# Patient Record
Sex: FEMALE | Race: WHITE | Employment: FULL TIME | URBAN - METROPOLITAN AREA
[De-identification: names, ages, dates, MRNs, and addresses within clinical notes are randomized per-mention and may not be internally consistent; named-entity substitution may affect disease eponyms.]

---

## 2017-01-30 PROBLEM — M51.24 HERNIATED THORACIC DISC WITHOUT MYELOPATHY: Chronic | Status: ACTIVE | Noted: 2017-01-30

## 2017-10-23 ENCOUNTER — HOSPITAL ENCOUNTER (OUTPATIENT)
Dept: GENERAL RADIOLOGY | Age: 47
Discharge: HOME | End: 2017-10-23
Attending: RADIOLOGY | Admitting: RADIOLOGY
Payer: COMMERCIAL

## 2017-10-23 ENCOUNTER — HOSPITAL ENCOUNTER (OUTPATIENT)
Dept: CT IMAGING | Age: 47
Discharge: HOME | End: 2017-10-23
Attending: NEUROLOGICAL SURGERY
Payer: COMMERCIAL

## 2017-10-23 VITALS
WEIGHT: 145.31 LBS | BODY MASS INDEX: 25.75 KG/M2 | SYSTOLIC BLOOD PRESSURE: 134 MMHG | HEIGHT: 63 IN | HEART RATE: 62 BPM | DIASTOLIC BLOOD PRESSURE: 78 MMHG | OXYGEN SATURATION: 99 % | TEMPERATURE: 97.7 F | RESPIRATION RATE: 16 BRPM

## 2017-10-23 DIAGNOSIS — M54.50 LOW BACK PAIN: ICD-10-CM

## 2017-10-23 DIAGNOSIS — M51.04 INTERVERTEBRAL THORACIC DISC DISORDER WITH MYELOPATHY, THORACIC REGION: ICD-10-CM

## 2017-10-23 DIAGNOSIS — M51.04 THORACIC DISC DISORDER WITH MYELOPATHY: ICD-10-CM

## 2017-10-23 PROCEDURE — 72129 CT CHEST SPINE W/DYE: CPT

## 2017-10-23 PROCEDURE — 62305 MYELOGRAPHY LUMBAR INJECTION: CPT

## 2017-10-23 PROCEDURE — 74011636320 HC RX REV CODE- 636/320: Performed by: RADIOLOGY

## 2017-10-23 PROCEDURE — 74011250637 HC RX REV CODE- 250/637: Performed by: RADIOLOGY

## 2017-10-23 PROCEDURE — 72132 CT LUMBAR SPINE W/DYE: CPT

## 2017-10-23 RX ORDER — OXYCODONE AND ACETAMINOPHEN 10; 325 MG/1; MG/1
1 TABLET ORAL
Status: DISCONTINUED | OUTPATIENT
Start: 2017-10-23 | End: 2017-10-23

## 2017-10-23 RX ORDER — DIAZEPAM 5 MG/1
10 TABLET ORAL
Status: COMPLETED | OUTPATIENT
Start: 2017-10-23 | End: 2017-10-23

## 2017-10-23 RX ORDER — LIDOCAINE HYDROCHLORIDE 10 MG/ML
1-5 INJECTION, SOLUTION EPIDURAL; INFILTRATION; INTRACAUDAL; PERINEURAL
Status: COMPLETED | OUTPATIENT
Start: 2017-10-23 | End: 2017-10-23

## 2017-10-23 RX ORDER — HYDROMORPHONE HYDROCHLORIDE 2 MG/1
4 TABLET ORAL
Status: DISCONTINUED | OUTPATIENT
Start: 2017-10-23 | End: 2017-10-23 | Stop reason: HOSPADM

## 2017-10-23 RX ORDER — GABAPENTIN 600 MG/1
1200 TABLET ORAL 3 TIMES DAILY
COMMUNITY

## 2017-10-23 RX ORDER — LIDOCAINE HYDROCHLORIDE 10 MG/ML
1-5 INJECTION INFILTRATION; PERINEURAL
Status: DISCONTINUED | OUTPATIENT
Start: 2017-10-23 | End: 2017-10-23

## 2017-10-23 RX ORDER — HYDROMORPHONE HYDROCHLORIDE 4 MG/1
4 TABLET ORAL
Status: DISCONTINUED | OUTPATIENT
Start: 2017-10-23 | End: 2017-10-23 | Stop reason: CLARIF

## 2017-10-23 RX ORDER — TIZANIDINE HYDROCHLORIDE 2 MG/1
2 CAPSULE, GELATIN COATED ORAL 3 TIMES DAILY
COMMUNITY

## 2017-10-23 RX ORDER — TRAMADOL HYDROCHLORIDE 50 MG/1
100 TABLET ORAL
COMMUNITY

## 2017-10-23 RX ADMIN — IOPAMIDOL 20 ML: 408 INJECTION, SOLUTION INTRATHECAL at 11:50

## 2017-10-23 RX ADMIN — HYDROMORPHONE HYDROCHLORIDE 4 MG: 2 TABLET ORAL at 13:20

## 2017-10-23 RX ADMIN — DIAZEPAM 10 MG: 5 TABLET ORAL at 11:31

## 2017-10-23 RX ADMIN — LIDOCAINE HYDROCHLORIDE 5 ML: 10 INJECTION, SOLUTION EPIDURAL; INFILTRATION; INTRACAUDAL; PERINEURAL at 11:48

## 2017-10-23 NOTE — IP AVS SNAPSHOT
[address] Patient: Lynda Georges MRN: [id number] :1970 You are allergic to the following Allergen Reactions Sulfa (Sulfonamide Antibiotics) Anaphylaxis Penicillins Unknown (comments) As a child Recent Documentation Height Weight BMI OB Status Smoking Status 1.6 m 65.9 kg 25.74 kg/m2 Hysterectomy Former Smoker Emergency Contacts Name Discharge Info Relation Home Work Mobile [employer] DISCHARGE CAREGIVER [3] Spouse [3]   [phone] DonteSol DISCHARGE CAREGIVER [3] Daughter [21] [phone] About your hospitalization You were admitted on:  2017 You last received care in the:  [address] [facility] [address] [place] were discharged on:  2017 Unit phone number:  [phone] Why you were hospitalized Your primary diagnosis was:  Not on File Providers Seen During Your Hospitalizations Provider Role Specialty Primary office phone Chemo Patel MD Attending Provider Radiology [phone] Your Primary Care Physician (PCP) Primary Care Physician Office Phone Office Fax NONE ** None ** ** None ** Follow-up Information Follow up With Details Comments Contact Info None   None ([phone]) Patient stated that they have no PCP Current Discharge Medication List  
  
CONTINUE these medications which have NOT CHANGED Dose & Instructions Dispensing Information Comments Morning Noon Evening Bedtime ALPRAZolam 1 mg tablet Commonly known as:  Mecca Mijares Your last dose was: Your next dose is:    
   
   
 Dose:  1 mg Take 1 mg by mouth two (2) times a day. Refills:  0 [facility] Your last dose was: Your next dose is:    
   
   
 Dose:  2 Puff Take 2 Puffs by inhalation two (2) times a day. Refills:  0 loratadine 10 mg Cap Your last dose was: Your next dose is: Take  by mouth. Refills:  0  
     
   
   
   
  
 losartan-hydroCHLOROthiazide 50-12.5 mg per tablet Commonly known as:  HYZAAR Your last dose was: Your next dose is:    
   
   
 Dose:  1 Tab Take 1 Tab by mouth daily. Refills:  0 MIRALAX 17 gram/dose powder Generic drug:  polyethylene glycol Your last dose was: Your next dose is:    
   
   
 Dose:  17 g Take 17 g by mouth as needed. Refills:  0  
     
   
   
   
  
 montelukast 10 mg tablet Commonly known as:  SINGULAIR Your last dose was: Your next dose is:    
   
   
 Dose:  10 mg Take 10 mg by mouth daily. Refills:  0 NEURONTIN 600 mg tablet Generic drug:  gabapentin Your last dose was: Your next dose is:    
   
   
 Dose:  1200 mg Take 1,200 mg by mouth three (3) times daily. Indications: NEUROPATHIC PAIN Refills:  0  
     
   
   
   
  
 omeprazole 40 mg capsule Commonly known as:  PRILOSEC Your last dose was: Your next dose is:    
   
   
 Dose:  40 mg Take 40 mg by mouth daily. Refills:  0 QVAR IN Your last dose was: Your next dose is:    
   
   
 Dose:  1 Puff Take 1 Puff by inhalation daily. Refills:  0  
     
   
   
   
  
 traMADol 50 mg tablet Commonly known as:  ULTRAM  
   
Your last dose was: Your next dose is:    
   
   
 Dose:  100 mg Take 100 mg by mouth every eight (8) hours as needed for Pain. Refills:  0  
     
   
   
   
  
 ZANAFLEX 2 mg capsule Generic drug:  tiZANidine Your last dose was: Your next dose is:    
   
   
 Dose:  2 mg Take 2 mg by mouth three (3) times daily. Refills:  0 Discharge Instructions Please call the [facility] Radiology department at [phone] for specific questions following your exam.  
Valium (Diazepam) Discharge instructions You received the narcotic listed above. This medication was given to you to help decrease your anxiety/discomfort and allow you to complete your examination Common Side Effects: 
 
Impairment of memory for up to 24 hours, sleepiness, slurred speech, dizziness, visual disturbance such as blurred vision or difficulty focusing, confusion, euphoria (pain relief), chills, ears feeling blocked or dreaming. It is recommended that you do not drive or operate equipment for at least 24 hours. You should not drink any alcoholic beverages for at least 24 hours. Do not take any other muscle relaxers, sedatives, hypnotics, or mood altering medications for 24 hours unless ordered by your physician who is aware that you received this medication. Call your physician for any questions or problems. He/She may contact this Radiology Department for further information. Leila Kathleen RN Myelogram: About This Test 
What is it? A myelogram uses X-rays and a special dye to make pictures of bones and nerves of the spine (spinal canal). The spinal canal holds the spinal cord, the spinal nerve roots, and the fluid-filled space between the bones in your spine. Why is this test done? A myelogram is done to check for: · The cause of arm or leg numbness, weakness, or pain. · Narrowing of the spinal canal (spinal stenosis). · A tumor or infection causing problems with the spinal cord or nerve roots. · A spinal disc that has ruptured (herniated disc). · Inflammation of the membrane that covers the brain and spinal cord. · Problems with the blood vessels to the spine.  
How can you prepare for the test? 
Your doctor will tell you if you need to change how much you eat and drink before the myelogram. You may be asked to increase the amount of water you drink before the test. Follow the instructions your doctor gives you about eating and drinking. Before a myelogram, tell your doctor if: 
· You are allergic to any medicines, contrast material, or iodine dye. · You have had bleeding problems, or you take a blood thinner, such as aspirin, clopidogrel (Plavix), or warfarin (Coumadin), or you take over-the-counter medicines, such as ibuprofen (Advil, Motrin) or naproxen (Aleve). Your doctor will tell you when you should stop taking these medicines several days before your procedure. Make sure that you understand exactly what he or she wants you to do. · You have had kidney problems. · You have diabetes, especially if you take metformin (Glucophage, for example). · You are or might be pregnant. Ask someone to take you home and stay with you after the test. 
What happens during the test? 
The dye is put into your spinal canal with a thin needle. This is called a lumbar puncture. The dye moves through the space so the nerve roots and spinal cord can be seen more clearly. After the dye is put in, you will lie still while the X-ray pictures are taken. How does it feel? You will feel a quick sting from a small needle that has medicine to numb the skin on your back. You will also feel some pressure as the long, thin spinal needle is put into your spinal canal. You may feel a quick sharp pain down your buttock or leg when the needle is moved in your spine. The dye may make you feel warm and flushed and have a metallic taste in your mouth. What else should you know about the test? 
In rare cases, the hole made by the needle in the sac around the spine does not close normally. This can allow spinal fluid to leak out. This leak may need to be repaired through a procedure called an epidural blood patch. To do the patch, your doctor injects some of your own blood to cover the hole. How long does the test take? · A myelogram usually takes 30 minutes to 1 hour. What happens after the test? 
· You will probably be able to go home 30 minutes to 2 hours after the test. 
· You may need to lie in bed with your head raised for 4 to 24 hours after the test. To prevent seizures, which are a rare side effect, do not bend over or lie down with your head lower than your body. Keeping your head higher than your body after a myelogram also may help prevent or reduce other side effects, such as headache, nausea, or vomiting. · Avoid strenuous activity, such as running or heavy lifting, for at least 1 day after your myelogram. 
· Drink plenty of water after the myelogram. Your doctor will give you instructions on taking your regular medicines. When should you call for help? Call 911 anytime you think you may need emergency care. For example, call if: 
· You have a seizure. Call your doctor now or seek immediate medical care if: 
· You have any increase in pain, weakness, or numbness in your legs. · You have a severe headache or stiff neck, or your eyes become very sensitive to light. · You have a headache that lasts longer than 24 hours. · You have problems urinating or having a bowel movement. · You have a fever. Follow-up care is a key part of your treatment and safety. Be sure to make and go to all appointments, and call your doctor if you are having problems. It's also a good idea to keep a list of the medicines you take. Ask your doctor when you can expect to have your test results. Where can you learn more? Go to http://debbie-richie.info/. Enter C656 in the search box to learn more about \"Myelogram: About This Test.\" Current as of: October 14, 2016 Content Version: 11.3 © 3962-9294 [employer].  Care instructions adapted under license by [employer] (which disclaims liability or warranty for this information). If you have questions about a medical condition or this instruction, always ask your healthcare professional. [address] any warranty or liability for your use of this information. DISCHARGE SUMMARY from Nurse The following personal items are in your possession at time of discharge: 
 
Dental Appliances: None Home Medications: None Jewelry: None Clothing: Pants, Shirt, Footwear Other Valuables: Cell Phone, Eri Hand PATIENT INSTRUCTIONS: 
 
After general anesthesia or intravenous sedation, for 24 hours or while taking prescription Narcotics: · Limit your activities · Do not drive and operate hazardous machinery · Do not make important personal or business decisions · Do  not drink alcoholic beverages · If you have not urinated within 8 hours after discharge, please contact your surgeon on call. Report the following to your surgeon: 
· Excessive pain, swelling, redness or odor of or around the surgical area · Temperature over 100.5 · Nausea and vomiting lasting longer than 4 hours or if unable to take medications · Any signs of decreased circulation or nerve impairment to extremity: change in color, persistent  numbness, tingling, coldness or increase pain · Any questions What to do at Home: 
Recommended activity: Activity as tolerated and no driving for today. These are general instructions for a healthy lifestyle: No smoking/ No tobacco products/ Avoid exposure to second hand smoke Surgeon General's Warning:  Quitting smoking now greatly reduces serious risk to your health. Obesity, smoking, and sedentary lifestyle greatly increases your risk for illness A healthy diet, regular physical exercise & weight monitoring are important for maintaining a healthy lifestyle You may be retaining fluid if you have a history of heart failure or if you experience any of the following symptoms:  Weight gain of 3 pounds or more overnight or 5 pounds in a week, increased swelling in our hands or feet or shortness of breath while lying flat in bed. Please call your doctor as soon as you notice any of these symptoms; do not wait until your next office visit. Recognize signs and symptoms of STROKE: 
 
F-face looks uneven A-arms unable to move or move unevenly S-speech slurred or non-existent T-time-call 911 as soon as signs and symptoms begin-DO NOT go Back to bed or wait to see if you get better-TIME IS BRAIN. Warning Signs of HEART ATTACK Call 911 if you have these symptoms: 
? Chest discomfort. Most heart attacks involve discomfort in the center of the chest that lasts more than a few minutes, or that goes away and comes back. It can feel like uncomfortable pressure, squeezing, fullness, or pain. ? Discomfort in other areas of the upper body. Symptoms can include pain or discomfort in one or both arms, the back, neck, jaw, or stomach. ? Shortness of breath with or without chest discomfort. ? Other signs may include breaking out in a cold sweat, nausea, or lightheadedness. Don't wait more than five minutes to call [address]! Fast action can save your life. Calling 911 is almost always the fastest way to get lifesaving treatment. Emergency Medical Services staff can begin treatment when they arrive  up to an hour sooner than if someone gets to the hospital by car. The discharge information has been reviewed with the patient. The patient verbalized understanding. Discharge medications reviewed with the patient and appropriate educational materials and side effects teaching were provided. Patient armband removed and given to patient to take home. Patient was informed of the privacy risks if armband lost or stolen Discharge Orders None Introducing [facility]!    
 Dear Terrie Benitez: 
 Thank you for requesting a Spotjournal account. Our records indicate that you already have an active Spotjournal account. You can access your account anytime at [email or web address] Did you know that you can access your hospital and ER discharge instructions at any time in Spotjournal? You can also review all of your test results from your hospital stay or ER visit. Additional Information If you have questions, please visit the Frequently Asked Questions section of the Spotjournal website at [email or web address] Remember, Spotjournal is NOT to be used for urgent needs. For medical emergencies, dial 911. Now available from your iPhone and Android! General Information Please provide this summary of care documentation to your next provider. Patient Signature:  ____________________________________________________________ Date:  ____________________________________________________________  
  
Alana Alina Provider Signature:  ____________________________________________________________ Date:  ____________________________________________________________

## 2017-10-23 NOTE — DISCHARGE INSTRUCTIONS
Please call the [facility] Radiology department at [phone] for specific questions following your exam.   Valium (Diazepam) Discharge instructions    You received the narcotic listed above. This medication was given to you to help decrease your anxiety/discomfort and allow you to complete your examination       Common Side Effects:    Impairment of memory for up to 24 hours, sleepiness, slurred speech, dizziness, visual disturbance such as blurred vision or difficulty focusing, confusion, euphoria (pain relief), chills, ears feeling blocked or dreaming. It is recommended that you do not drive or operate equipment for at least 24 hours. You should not drink any alcoholic beverages for at least 24 hours. Do not take any other muscle relaxers, sedatives, hypnotics, or mood altering medications for 24 hours unless ordered by your physician who is aware that you received this medication. Call your physician for any questions or problems. He/She may contact this Radiology Department for further information. Drake Ohara RN          Myelogram: About This Test  What is it? A myelogram uses X-rays and a special dye to make pictures of bones and nerves of the spine (spinal canal). The spinal canal holds the spinal cord, the spinal nerve roots, and the fluid-filled space between the bones in your spine. Why is this test done? A myelogram is done to check for:  · The cause of arm or leg numbness, weakness, or pain. · Narrowing of the spinal canal (spinal stenosis). · A tumor or infection causing problems with the spinal cord or nerve roots. · A spinal disc that has ruptured (herniated disc). · Inflammation of the membrane that covers the brain and spinal cord. · Problems with the blood vessels to the spine.   How can you prepare for the test?  Your doctor will tell you if you need to change how much you eat and drink before the myelogram. You may be asked to increase the amount of water you drink before the test. Follow the instructions your doctor gives you about eating and drinking. Before a myelogram, tell your doctor if:  · You are allergic to any medicines, contrast material, or iodine dye. · You have had bleeding problems, or you take a blood thinner, such as aspirin, clopidogrel (Plavix), or warfarin (Coumadin), or you take over-the-counter medicines, such as ibuprofen (Advil, Motrin) or naproxen (Aleve). Your doctor will tell you when you should stop taking these medicines several days before your procedure. Make sure that you understand exactly what he or she wants you to do. · You have had kidney problems. · You have diabetes, especially if you take metformin (Glucophage, for example). · You are or might be pregnant. Ask someone to take you home and stay with you after the test.  What happens during the test?  The dye is put into your spinal canal with a thin needle. This is called a lumbar puncture. The dye moves through the space so the nerve roots and spinal cord can be seen more clearly. After the dye is put in, you will lie still while the X-ray pictures are taken. How does it feel? You will feel a quick sting from a small needle that has medicine to numb the skin on your back. You will also feel some pressure as the long, thin spinal needle is put into your spinal canal. You may feel a quick sharp pain down your buttock or leg when the needle is moved in your spine. The dye may make you feel warm and flushed and have a metallic taste in your mouth. What else should you know about the test?  In rare cases, the hole made by the needle in the sac around the spine does not close normally. This can allow spinal fluid to leak out. This leak may need to be repaired through a procedure called an epidural blood patch. To do the patch, your doctor injects some of your own blood to cover the hole. How long does the test take?   · A myelogram usually takes 30 minutes to 1 hour.  What happens after the test?  · You will probably be able to go home 30 minutes to 2 hours after the test.  · You may need to lie in bed with your head raised for 4 to 24 hours after the test. To prevent seizures, which are a rare side effect, do not bend over or lie down with your head lower than your body. Keeping your head higher than your body after a myelogram also may help prevent or reduce other side effects, such as headache, nausea, or vomiting. · Avoid strenuous activity, such as running or heavy lifting, for at least 1 day after your myelogram.  · Drink plenty of water after the myelogram. Your doctor will give you instructions on taking your regular medicines. When should you call for help? Call 911 anytime you think you may need emergency care. For example, call if:  · You have a seizure. Call your doctor now or seek immediate medical care if:  · You have any increase in pain, weakness, or numbness in your legs. · You have a severe headache or stiff neck, or your eyes become very sensitive to light. · You have a headache that lasts longer than 24 hours. · You have problems urinating or having a bowel movement. · You have a fever. Follow-up care is a key part of your treatment and safety. Be sure to make and go to all appointments, and call your doctor if you are having problems. It's also a good idea to keep a list of the medicines you take. Ask your doctor when you can expect to have your test results. Where can you learn more? Go to http://debbie-richie.info/. Enter U205 in the search box to learn more about \"Myelogram: About This Test.\"  Current as of: October 14, 2016  Content Version: 11.3  © 6394-8430 [employer]. Care instructions adapted under license by [employer] (which disclaims liability or warranty for this information).  If you have questions about a medical condition or this instruction, always ask your healthcare professional. [employer] disclaims any warranty or liability for your use of this information. DISCHARGE SUMMARY from Nurse    The following personal items are in your possession at time of discharge:    Dental Appliances: None        Home Medications: None  Jewelry: None  Clothing: Pants, Shirt, Footwear  Other Valuables: Cell Phone, Purse, Wallet             PATIENT INSTRUCTIONS:    After general anesthesia or intravenous sedation, for 24 hours or while taking prescription Narcotics:  · Limit your activities  · Do not drive and operate hazardous machinery  · Do not make important personal or business decisions  · Do  not drink alcoholic beverages  · If you have not urinated within 8 hours after discharge, please contact your surgeon on call. Report the following to your surgeon:  · Excessive pain, swelling, redness or odor of or around the surgical area  · Temperature over 100.5  · Nausea and vomiting lasting longer than 4 hours or if unable to take medications  · Any signs of decreased circulation or nerve impairment to extremity: change in color, persistent  numbness, tingling, coldness or increase pain  · Any questions        What to do at Home:  Recommended activity: Activity as tolerated and no driving for today. These are general instructions for a healthy lifestyle:    No smoking/ No tobacco products/ Avoid exposure to second hand smoke    Surgeon General's Warning:  Quitting smoking now greatly reduces serious risk to your health. Obesity, smoking, and sedentary lifestyle greatly increases your risk for illness    A healthy diet, regular physical exercise & weight monitoring are important for maintaining a healthy lifestyle    You may be retaining fluid if you have a history of heart failure or if you experience any of the following symptoms:  Weight gain of 3 pounds or more overnight or 5 pounds in a week, increased swelling in our hands or feet or shortness of breath while lying flat in bed. Please call your doctor as soon as you notice any of these symptoms; do not wait until your next office visit. Recognize signs and symptoms of STROKE:    F-face looks uneven    A-arms unable to move or move unevenly    S-speech slurred or non-existent    T-time-call 911 as soon as signs and symptoms begin-DO NOT go       Back to bed or wait to see if you get better-TIME IS BRAIN. Warning Signs of HEART ATTACK     Call 911 if you have these symptoms:   Chest discomfort. Most heart attacks involve discomfort in the center of the chest that lasts more than a few minutes, or that goes away and comes back. It can feel like uncomfortable pressure, squeezing, fullness, or pain.  Discomfort in other areas of the upper body. Symptoms can include pain or discomfort in one or both arms, the back, neck, jaw, or stomach.  Shortness of breath with or without chest discomfort.  Other signs may include breaking out in a cold sweat, nausea, or lightheadedness. Don't wait more than five minutes to call 911 - MINUTES MATTER! Fast action can save your life. Calling 911 is almost always the fastest way to get lifesaving treatment. Emergency Medical Services staff can begin treatment when they arrive -- up to an hour sooner than if someone gets to the hospital by car. The discharge information has been reviewed with the patient. The patient verbalized understanding. Discharge medications reviewed with the patient and appropriate educational materials and side effects teaching were provided. Patient armband removed and given to patient to take home.   Patient was informed of the privacy risks if armband lost or stolen

## 2017-10-23 NOTE — IP AVS SNAPSHOT
Summary of Care Report The Summary of Care report has been created to help improve care coordination. Users with access to [employer] or [address] (Web-based application) may access additional patient information including the Discharge Summary. If you are not currently a [address] user and need more information, please call the number listed below in the [place] [address] section and ask to be connected with Medical Records. Facility Information Name Address Phone [facility] [address] [place] [id number] [phone] Patient Information Patient Name Sex JUAN Macedo ([id number]) Female 1970 Discharge Information Admitting Provider Service Area Unit Carito Barcenas MD / [address]  / [phone] Discharge Provider Discharge Date/Time Discharge Disposition Destination (none) 10/23/2017 15:45 (Pending) AHR (none) Patient Language Language ENGLISH [13] Hospital Problems as of 10/23/2017  Reviewed: 2017  8:08 AM by Kayleigh Noyola MD  
 None Non-Hospital Problems as of 10/23/2017  Reviewed: 2017  8:08 AM by Kayleigh Noyola MD  
  
  
  
 Class Noted - Resolved Last Modified Active Problems Herniated thoracic disc without myelopathy (Chronic)  2017 - Present 2017 by Kayleigh Noyola MD  
  Entered by Kayleigh Noyola MD  
  
You are allergic to the following Allergen Reactions Sulfa (Sulfonamide Antibiotics) Anaphylaxis Penicillins Unknown (comments) As a child Current Discharge Medication List  
  
CONTINUE these medications which have NOT CHANGED Dose & Instructions Dispensing Information Comments ALPRAZolam 1 mg tablet Commonly known as:  Varghese Shea Dose:  1 mg Take 1 mg by mouth two (2) times a day. Refills:  0 [address] Dose:  2 Puff Take 2 Puffs by inhalation two (2) times a day. Refills:  0  
   
 loratadine 10 mg Cap Take  by mouth. Refills:  0  
   
 losartan-hydroCHLOROthiazide 50-12.5 mg per tablet Commonly known as:  HYZAAR Dose:  1 Tab Take 1 Tab by mouth daily. Refills:  0 MIRALAX 17 gram/dose powder Generic drug:  polyethylene glycol Dose:  17 g Take 17 g by mouth as needed. Refills:  0  
   
 montelukast 10 mg tablet Commonly known as:  SINGULAIR Dose:  10 mg Take 10 mg by mouth daily. Refills:  0 NEURONTIN 600 mg tablet Generic drug:  gabapentin Dose:  1200 mg Take 1,200 mg by mouth three (3) times daily. Indications: NEUROPATHIC PAIN Refills:  0  
   
 omeprazole 40 mg capsule Commonly known as:  PRILOSEC Dose:  40 mg Take 40 mg by mouth daily. Refills:  0 QVAR IN Dose:  1 Puff Take 1 Puff by inhalation daily. Refills:  0  
   
 traMADol 50 mg tablet Commonly known as:  ULTRAM  
 Dose:  100 mg Take 100 mg by mouth every eight (8) hours as needed for Pain. Refills:  0  
   
 ZANAFLEX 2 mg capsule Generic drug:  tiZANidine Dose:  2 mg Take 2 mg by mouth three (3) times daily. Refills:  0 Surgery Information ID Date/Time Status Primary Surgeon All Procedures Location [id number] 10/23/2017 [facility] RAD ANGIO IR OR-DO NOT SCHEDULE Follow-up Information Follow up With Details Comments Contact Info None   None (395) Patient stated that they have no PCP Discharge Instructions Please call the [facility] Radiology department at [phone] for specific questions following your exam.  
Valium (Diazepam) Discharge instructions You received the narcotic listed above. This medication was given to you to help decrease your anxiety/discomfort and allow you to complete your examination Common Side Effects: Impairment of memory for up to 24 hours, sleepiness, slurred speech, dizziness, visual disturbance such as blurred vision or difficulty focusing, confusion, euphoria (pain relief), chills, ears feeling blocked or dreaming. It is recommended that you do not drive or operate equipment for at least 24 hours. You should not drink any alcoholic beverages for at least 24 hours. Do not take any other muscle relaxers, sedatives, hypnotics, or mood altering medications for 24 hours unless ordered by your physician who is aware that you received this medication. Call your physician for any questions or problems. He/She may contact this Radiology Department for further information. Jacoby Allen RN Myelogram: About This Test 
What is it? A myelogram uses X-rays and a special dye to make pictures of bones and nerves of the spine (spinal canal). The spinal canal holds the spinal cord, the spinal nerve roots, and the fluid-filled space between the bones in your spine. Why is this test done? A myelogram is done to check for: · The cause of arm or leg numbness, weakness, or pain. · Narrowing of the spinal canal (spinal stenosis). · A tumor or infection causing problems with the spinal cord or nerve roots. · A spinal disc that has ruptured (herniated disc). · Inflammation of the membrane that covers the brain and spinal cord. · Problems with the blood vessels to the spine. How can you prepare for the test? 
Your doctor will tell you if you need to change how much you eat and drink before the myelogram. You may be asked to increase the amount of water you drink before the test. Follow the instructions your doctor gives you about eating and drinking. Before a myelogram, tell your doctor if: 
· You are allergic to any medicines, contrast material, or iodine dye.  
· You have had bleeding problems, or you take a blood thinner, such as aspirin, clopidogrel (Plavix), or warfarin (Coumadin), or you take over-the-counter medicines, such as ibuprofen (Advil, Motrin) or naproxen (Aleve). Your doctor will tell you when you should stop taking these medicines several days before your procedure. Make sure that you understand exactly what he or she wants you to do. · You have had kidney problems. · You have diabetes, especially if you take metformin (Glucophage, for example). · You are or might be pregnant. Ask someone to take you home and stay with you after the test. 
What happens during the test? 
The dye is put into your spinal canal with a thin needle. This is called a lumbar puncture. The dye moves through the space so the nerve roots and spinal cord can be seen more clearly. After the dye is put in, you will lie still while the X-ray pictures are taken. How does it feel? You will feel a quick sting from a small needle that has medicine to numb the skin on your back. You will also feel some pressure as the long, thin spinal needle is put into your spinal canal. You may feel a quick sharp pain down your buttock or leg when the needle is moved in your spine. The dye may make you feel warm and flushed and have a metallic taste in your mouth. What else should you know about the test? 
In rare cases, the hole made by the needle in the sac around the spine does not close normally. This can allow spinal fluid to leak out. This leak may need to be repaired through a procedure called an epidural blood patch. To do the patch, your doctor injects some of your own blood to cover the hole. How long does the test take? · A myelogram usually takes 30 minutes to 1 hour.  
What happens after the test? 
· You will probably be able to go home 30 minutes to 2 hours after the test. 
· You may need to lie in bed with your head raised for 4 to 24 hours after the test. To prevent seizures, which are a rare side effect, do not bend over or lie down with your head lower than your body. Keeping your head higher than your body after a myelogram also may help prevent or reduce other side effects, such as headache, nausea, or vomiting. · Avoid strenuous activity, such as running or heavy lifting, for at least 1 day after your myelogram. 
· Drink plenty of water after the myelogram. Your doctor will give you instructions on taking your regular medicines. When should you call for help? Call 911 anytime you think you may need emergency care. For example, call if: 
· You have a seizure. Call your doctor now or seek immediate medical care if: 
· You have any increase in pain, weakness, or numbness in your legs. · You have a severe headache or stiff neck, or your eyes become very sensitive to light. · You have a headache that lasts longer than 24 hours. · You have problems urinating or having a bowel movement. · You have a fever. Follow-up care is a key part of your treatment and safety. Be sure to make and go to all appointments, and call your doctor if you are having problems. It's also a good idea to keep a list of the medicines you take. Ask your doctor when you can expect to have your test results. Where can you learn more? Go to http://debbie-richie.info/. Enter L559 in the search box to learn more about \"Myelogram: About This Test.\" Current as of: October 14, 2016 Content Version: 11.3 © 2220-9226 [employer]. Care instructions adapted under license by [employer] (which disclaims liability or warranty for this information). If you have questions about a medical condition or this instruction, always ask your healthcare professional. [place] [address] any warranty or liability for your use of this information. DISCHARGE SUMMARY from Nurse The following personal items are in your possession at time of discharge: 
 
Dental Appliances: None Home Medications: None Jewelry: None Clothing: Pants, Shirt, Footwear Other Valuables: Cell Phone, Olimpia Quivers PATIENT INSTRUCTIONS: 
 
After general anesthesia or intravenous sedation, for 24 hours or while taking prescription Narcotics: · Limit your activities · Do not drive and operate hazardous machinery · Do not make important personal or business decisions · Do  not drink alcoholic beverages · If you have not urinated within 8 hours after discharge, please contact your surgeon on call. Report the following to your surgeon: 
· Excessive pain, swelling, redness or odor of or around the surgical area · Temperature over 100.5 · Nausea and vomiting lasting longer than 4 hours or if unable to take medications · Any signs of decreased circulation or nerve impairment to extremity: change in color, persistent  numbness, tingling, coldness or increase pain · Any questions What to do at Home: 
Recommended activity: Activity as tolerated and no driving for today. These are general instructions for a healthy lifestyle: No smoking/ No tobacco products/ Avoid exposure to second hand smoke Surgeon General's Warning:  Quitting smoking now greatly reduces serious risk to your health. Obesity, smoking, and sedentary lifestyle greatly increases your risk for illness A healthy diet, regular physical exercise & weight monitoring are important for maintaining a healthy lifestyle You may be retaining fluid if you have a history of heart failure or if you experience any of the following symptoms:  Weight gain of 3 pounds or more overnight or 5 pounds in a week, increased swelling in our hands or feet or shortness of breath while lying flat in bed. Please call your doctor as soon as you notice any of these symptoms; do not wait until your next office visit. Recognize signs and symptoms of STROKE: 
 
F-face looks uneven A-arms unable to move or move unevenly S-speech slurred or non-existent T-time-call 911 as soon as signs and symptoms begin-DO NOT go Back to bed or wait to see if you get better-TIME IS BRAIN. Warning Signs of HEART ATTACK Call 911 if you have these symptoms: 
? Chest discomfort. Most heart attacks involve discomfort in the center of the chest that lasts more than a few minutes, or that goes away and comes back. It can feel like uncomfortable pressure, squeezing, fullness, or pain. ? Discomfort in other areas of the upper body. Symptoms can include pain or discomfort in one or both arms, the back, neck, jaw, or stomach. ? Shortness of breath with or without chest discomfort. ? Other signs may include breaking out in a cold sweat, nausea, or lightheadedness. Don't wait more than five minutes to call [address]! Fast action can save your life. Calling 911 is almost always the fastest way to get lifesaving treatment. Emergency Medical Services staff can begin treatment when they arrive  up to an hour sooner than if someone gets to the hospital by car. The discharge information has been reviewed with the patient. The patient verbalized understanding. Discharge medications reviewed with the patient and appropriate educational materials and side effects teaching were provided. Patient armband removed and given to patient to take home. Patient was informed of the privacy risks if armband lost or stolen Chart Review Routing History No Routing History on File

## 2017-10-23 NOTE — PROCEDURES
Neuroradiology Brief Procedure Note    Interventional Radiologist: Blanquita Ramirez MD    Pre-operative Diagnosis: Pain. Post-operative Diagnosis: Same as pre-operative diagnosis    Procedure(s) Performed:  Thoracic and  Lumbar Myelogram    Anesthesia:  Local    Findings:  Informed consent. Thoracic and lumbar myelogram performed. Please see final dictated report for full details.     Complications: No immediate    Estimated Blood Loss:  None    Specimens: None    Blanquita Ramirez MD, MD  [facility]  Neuroradiology  10/23/2017

## 2022-03-18 PROBLEM — M51.24 HERNIATED THORACIC DISC WITHOUT MYELOPATHY: Status: ACTIVE | Noted: 2017-01-30

## 2022-05-16 ENCOUNTER — TRANSCRIBE ORDER (OUTPATIENT)
Dept: SCHEDULING | Age: 52
End: 2022-05-16

## 2022-05-16 DIAGNOSIS — Z12.31 ENCOUNTER FOR SCREENING MAMMOGRAM FOR BREAST CANCER: Primary | ICD-10-CM

## 2023-01-31 DIAGNOSIS — Z12.31 ENCOUNTER FOR SCREENING MAMMOGRAM FOR BREAST CANCER: Primary | ICD-10-CM

## 2023-02-03 DIAGNOSIS — Z12.31 ENCOUNTER FOR SCREENING MAMMOGRAM FOR BREAST CANCER: Primary | ICD-10-CM

## 2023-09-06 RX ORDER — PROMETHAZINE HYDROCHLORIDE 25 MG/1
25 TABLET ORAL EVERY 6 HOURS PRN
COMMUNITY
Start: 2018-10-11

## 2023-09-06 NOTE — PERIOP NOTE
Instructions for your procedure at [facility] Date: 9/6/2023      Patient's Name: Tatiana Dale      Procedure Date: 9/25/2023        Please enter the main entrance of the hospital and check-in at the  located in the lobby. Do NOT eat or drink anything, including candy, gum, or ice chips after midnight prior to your procedure, unless it is part of your prep. Brush your teeth before coming to the hospital.You may swish with water, but do not swallow. No smoking/Vaping/E-Cigarettes 24 hours prior to the day of procedure. No alcohol 24 hours prior to the day of procedure. No recreational drugs for one week prior to the day of procedure. Bring Photo ID, Insurance information, and Co-pay if required on day of procedure. Bring in pertinent legal documents, such as, Medical Power of MELISSA AGUILAR, DNR, Advance Directive, etc.  Leave all other valuables, including money/purse, at home. Remove jewelry, including ALL body piercings, nail polish, acrylic nails, and makeup (including mascara); no lotions, powders, deodorant, and/or perfume/cologne/after shave on the skin. Glasses and dentures may be worn to the hospital.  They must be removed prior to procedure. Please bring case/container for glasses or dentures. 11. Contacts should not be worn on day of procedure. 12. Call the office ([phone]) if you have symptoms of a cold or illness within 24-48 hours prior to your procedure. 13. AN ADULT (relative or friend 18 years or older) MUST DRIVE YOU HOME AFTER YOUR PROCEDURE. 14. Please make arrangements for a responsible adult (18 years or older) to be with you for 24 hours after your procedure. 13. ONE VISITOR will be allowed in the waiting area during your procedure. Special Instructions:      Bring list of CURRENT medications.   Follow instructions from the office regarding Bowel Prep, Vitamins, Iron, Blood Thinners, Insulin, Seizure, and Blood

## 2023-09-23 ENCOUNTER — ANESTHESIA EVENT (OUTPATIENT)
Facility: HOSPITAL | Age: 53
End: 2023-09-23
Payer: MEDICARE

## 2023-09-25 ENCOUNTER — ANESTHESIA (OUTPATIENT)
Facility: HOSPITAL | Age: 53
End: 2023-09-25
Payer: MEDICARE

## 2023-09-25 ENCOUNTER — HOSPITAL ENCOUNTER (OUTPATIENT)
Facility: HOSPITAL | Age: 53
Setting detail: OUTPATIENT SURGERY
Discharge: HOME | End: 2023-09-25
Attending: INTERNAL MEDICINE | Admitting: INTERNAL MEDICINE
Payer: MEDICARE

## 2023-09-25 VITALS
HEART RATE: 61 BPM | WEIGHT: 112.56 LBS | OXYGEN SATURATION: 98 % | HEIGHT: 63 IN | DIASTOLIC BLOOD PRESSURE: 76 MMHG | RESPIRATION RATE: 20 BRPM | SYSTOLIC BLOOD PRESSURE: 130 MMHG | TEMPERATURE: 98.4 F | BODY MASS INDEX: 19.95 KG/M2

## 2023-09-25 PROCEDURE — 3600007502: Performed by: INTERNAL MEDICINE

## 2023-09-25 PROCEDURE — 2500000003 HC RX 250 WO HCPCS: Performed by: NURSE ANESTHETIST, CERTIFIED REGISTERED

## 2023-09-25 PROCEDURE — 2580000003 HC RX 258: Performed by: NURSE ANESTHETIST, CERTIFIED REGISTERED

## 2023-09-25 PROCEDURE — 2709999900 HC NON-CHARGEABLE SUPPLY: Performed by: INTERNAL MEDICINE

## 2023-09-25 PROCEDURE — 7100000000 HC PACU RECOVERY - FIRST 15 MIN: Performed by: INTERNAL MEDICINE

## 2023-09-25 PROCEDURE — 3600007512: Performed by: INTERNAL MEDICINE

## 2023-09-25 PROCEDURE — 3700000000 HC ANESTHESIA ATTENDED CARE: Performed by: INTERNAL MEDICINE

## 2023-09-25 PROCEDURE — 7100000011 HC PHASE II RECOVERY - ADDTL 15 MIN: Performed by: INTERNAL MEDICINE

## 2023-09-25 PROCEDURE — 7100000010 HC PHASE II RECOVERY - FIRST 15 MIN: Performed by: INTERNAL MEDICINE

## 2023-09-25 PROCEDURE — 3700000001 HC ADD 15 MINUTES (ANESTHESIA): Performed by: INTERNAL MEDICINE

## 2023-09-25 PROCEDURE — 6360000002 HC RX W HCPCS: Performed by: NURSE ANESTHETIST, CERTIFIED REGISTERED

## 2023-09-25 RX ORDER — FAMOTIDINE 20 MG/1
20 TABLET, FILM COATED ORAL ONCE
Status: DISCONTINUED | OUTPATIENT
Start: 2023-09-25 | End: 2023-09-25 | Stop reason: HOSPADM

## 2023-09-25 RX ORDER — LIDOCAINE HYDROCHLORIDE 10 MG/ML
1 INJECTION, SOLUTION EPIDURAL; INFILTRATION; INTRACAUDAL; PERINEURAL
Status: DISCONTINUED | OUTPATIENT
Start: 2023-09-25 | End: 2023-09-25 | Stop reason: HOSPADM

## 2023-09-25 RX ORDER — SODIUM CHLORIDE, SODIUM LACTATE, POTASSIUM CHLORIDE, CALCIUM CHLORIDE 600; 310; 30; 20 MG/100ML; MG/100ML; MG/100ML; MG/100ML
INJECTION, SOLUTION INTRAVENOUS CONTINUOUS
Status: DISCONTINUED | OUTPATIENT
Start: 2023-09-25 | End: 2023-09-25 | Stop reason: HOSPADM

## 2023-09-25 RX ORDER — LIDOCAINE HYDROCHLORIDE 20 MG/ML
INJECTION, SOLUTION EPIDURAL; INFILTRATION; INTRACAUDAL; PERINEURAL PRN
Status: DISCONTINUED | OUTPATIENT
Start: 2023-09-25 | End: 2023-09-25 | Stop reason: SDUPTHER

## 2023-09-25 RX ORDER — PROPOFOL 10 MG/ML
INJECTION, EMULSION INTRAVENOUS PRN
Status: DISCONTINUED | OUTPATIENT
Start: 2023-09-25 | End: 2023-09-25 | Stop reason: SDUPTHER

## 2023-09-25 RX ADMIN — LIDOCAINE HYDROCHLORIDE 40 MG: 20 INJECTION, SOLUTION EPIDURAL; INFILTRATION; INTRACAUDAL; PERINEURAL at 11:07

## 2023-09-25 RX ADMIN — PROPOFOL 150 MG: 10 INJECTION, EMULSION INTRAVENOUS at 11:07

## 2023-09-25 RX ADMIN — SODIUM CHLORIDE, POTASSIUM CHLORIDE, SODIUM LACTATE AND CALCIUM CHLORIDE: 600; 310; 30; 20 INJECTION, SOLUTION INTRAVENOUS at 10:48

## 2023-09-25 ASSESSMENT — PAIN - FUNCTIONAL ASSESSMENT: PAIN_FUNCTIONAL_ASSESSMENT: NONE - DENIES PAIN

## 2023-09-25 ASSESSMENT — PAIN SCALES - GENERAL
PAINLEVEL_OUTOF10: 0
PAINLEVEL_OUTOF10: 0

## 2023-09-25 NOTE — ANESTHESIA POSTPROCEDURE EVALUATION
Department of Anesthesiology  Postprocedure Note    Patient: Priya Boone  MRN: [id number]  YOB: 1970  Date of evaluation: 9/25/2023      Procedure Summary     Date: 09/25/23 Room / Location: [facility] ENDO 03 / [facility] END[facility]COPY    Anesthesia Start: 1104 Anesthesia Stop: 1129    Procedure: COLONOSCOPY (Abdomen) Diagnosis:       Lower GI bleed      Constipation, unspecified constipation type      BMI 22.0-22.9, adult      (Lower GI bleed [K92.2])      (Constipation, unspecified constipation type [K59.00])      (BMI 22.0-22.9, adult [Y53.02])    Surgeons: Keren Skiff, MD Responsible Provider: Bina Tony MD    Anesthesia Type: MAC ASA Status: 2          Anesthesia Type: No value filed.     Aparna Phase I: Aparna Score: 10    Aparna Phase II: Aparna Score: 10      Anesthesia Post Evaluation    Patient location during evaluation: PACU  Patient participation: complete - patient participated  Level of consciousness: sleepy but conscious  Pain score: 0  Airway patency: patent  Nausea & Vomiting: no nausea and no vomiting  Complications: no  Cardiovascular status: blood pressure returned to baseline  Respiratory status: acceptable  Hydration status: euvolemic  Pain management: adequate

## 2023-12-07 ENCOUNTER — OFFICE VISIT (OUTPATIENT)
Age: 53
End: 2023-12-07
Payer: MEDICARE

## 2023-12-07 VITALS — HEIGHT: 63 IN | BODY MASS INDEX: 20.91 KG/M2 | WEIGHT: 118 LBS

## 2023-12-07 DIAGNOSIS — M79.672 LEFT FOOT PAIN: Primary | ICD-10-CM

## 2023-12-07 DIAGNOSIS — M25.572 LEFT ANKLE PAIN, UNSPECIFIED CHRONICITY: ICD-10-CM

## 2023-12-07 DIAGNOSIS — S92.355A CLOSED NONDISPLACED FRACTURE OF FIFTH METATARSAL BONE OF LEFT FOOT, INITIAL ENCOUNTER: ICD-10-CM

## 2023-12-07 PROCEDURE — 1036F TOBACCO NON-USER: CPT | Performed by: ORTHOPAEDIC SURGERY

## 2023-12-07 PROCEDURE — 99203 OFFICE O/P NEW LOW 30 MIN: CPT | Performed by: ORTHOPAEDIC SURGERY

## 2023-12-07 PROCEDURE — G8420 CALC BMI NORM PARAMETERS: HCPCS | Performed by: ORTHOPAEDIC SURGERY

## 2023-12-07 PROCEDURE — G8484 FLU IMMUNIZE NO ADMIN: HCPCS | Performed by: ORTHOPAEDIC SURGERY

## 2023-12-07 PROCEDURE — 28470 CLTX METATARSAL FX WO MNP EA: CPT | Performed by: ORTHOPAEDIC SURGERY

## 2023-12-07 PROCEDURE — 3017F COLORECTAL CA SCREEN DOC REV: CPT | Performed by: ORTHOPAEDIC SURGERY

## 2023-12-07 PROCEDURE — G8427 DOCREV CUR MEDS BY ELIG CLIN: HCPCS | Performed by: ORTHOPAEDIC SURGERY

## 2023-12-07 NOTE — PATIENT INSTRUCTIONS
Foot Pain: Care Instructions  Your Care Instructions     Foot injuries that cause pain and swelling are fairly common. Almost all sports or home repair projects can cause a misstep that ends up as foot pain. Normal wear and tear, especially as you get older, also can cause foot pain. Most minor foot injuries will heal on their own, and home treatment is usually all you need to do. If you have a severe injury, you may need tests and treatment. Follow-up care is a key part of your treatment and safety. Be sure to make and go to all appointments, and call your doctor if you are having problems. It's also a good idea to know your test results and keep a list of the medicines you take. How can you care for yourself at home? Take pain medicines exactly as directed. If the doctor gave you a prescription medicine for pain, take it as prescribed. If you are not taking a prescription pain medicine, ask your doctor if you can take an over-the-counter medicine. Rest and protect your foot. Take a break from any activity that may cause pain. Put ice or a cold pack on your foot for 10 to 20 minutes at a time. Put a thin cloth between the ice and your skin. Prop up the sore foot on a pillow when you ice it or anytime you sit or lie down during the next 3 days. Try to keep it above the level of your heart. This will help reduce swelling. Your doctor may recommend that you wrap your foot with an elastic bandage. Keep your foot wrapped for as long as your doctor advises. If your doctor recommends crutches, use them as directed. Wear roomy footwear. As soon as pain and swelling end, begin gentle exercises of your foot. Your doctor can tell you which exercises will help. When should you call for help? Call 911 anytime you think you may need emergency care. For example, call if:    Your foot turns pale, white, blue, or cold.    Call your doctor now or seek immediate medical care if:    You cannot move or stand on your

## 2024-01-02 ENCOUNTER — OFFICE VISIT (OUTPATIENT)
Age: 54
End: 2024-01-02

## 2024-01-02 DIAGNOSIS — M79.672 LEFT FOOT PAIN: Primary | ICD-10-CM

## 2024-01-02 DIAGNOSIS — S92.355D CLOSED NONDISPLACED FRACTURE OF FIFTH METATARSAL BONE OF LEFT FOOT WITH ROUTINE HEALING, SUBSEQUENT ENCOUNTER: ICD-10-CM

## 2024-01-02 PROCEDURE — 99024 POSTOP FOLLOW-UP VISIT: CPT

## 2024-01-02 RX ORDER — VENLAFAXINE HYDROCHLORIDE 37.5 MG/1
CAPSULE, EXTENDED RELEASE ORAL
COMMUNITY
Start: 2024-01-01

## 2024-01-02 RX ORDER — CEFUROXIME AXETIL 500 MG/1
500 TABLET ORAL EVERY 12 HOURS
COMMUNITY
Start: 2023-12-28

## 2024-01-02 RX ORDER — OMEPRAZOLE 20 MG/1
20 CAPSULE, DELAYED RELEASE ORAL 2 TIMES DAILY
COMMUNITY
Start: 2023-12-13

## 2024-01-29 NOTE — PROGRESS NOTES
Name: Sheron Fuller    : 1970     2:33 PM 2023    11:50 AM 2023    11:40 AM 2023    11:28 AM 2023    10:33 AM 2023     1:01 PM 7/10/2023     2:29 PM   Ambulatory Bariatric Summary   Systolic  130 115 110 134     Diastolic  76 72 72 71     Pulse  61 72 48 72     Temp    98.4 °F (36.9 °C) 98.4 °F (36.9 °C)     Respirations  20 18 15 16     Weight - Scale 118    112.56 117 120   Height 1.6 m (5' 3\")     1.6 m (5' 3\") 1.6 m (5' 3\")   BMI 20.9 kg/m2    20 kg/m2 20.8 kg/m2 21.3 kg/m2   Weight - Scale 53.5 kg (118 lb)    51.1 kg (112 lb 9 oz) 53.1 kg (117 lb) 54.4 kg (120 lb)   BMI (Calculated) 20.9    20 20.8 21.3       There is no height or weight on file to calculate BMI.    Service Dept: [facility]  [address] [place] [address]  Dept: [phone]  Dept Fax: [phone]     Patient's Pharmacies:    [facility]/pharmacy #[id number] - [place], [place] - [address] [place] [address] -  [phone] -  [phone]  [address] [place] [address]  Phone: [phone] Fax: [phone]       Chief Complaint   Patient presents with    Fracture    Ankle Pain       HPI:  The patient is here with a chief complaint of left foot pain, throbbing, burning pain. Previously diagnosed with a non-displaced 5th metatarsal fracture. Pain is a 2/10. Pain is controlled with current analgesics.  Medication(s) being used: acetaminophen, ibuprofen (OTC).     X-rays of the left foot AP, lateral and oblique done in our office today show a well healed non-displaced 5th metatarsal fracture.     There were no vitals taken for this visit.   Allergies   Allergen Reactions    Escitalopram Anaphylaxis    Sulfa Antibiotics Anaphylaxis    Penicillins      Other reaction(s): Unknown (comments)  As a child      Current Outpatient Medications   Medication Sig Dispense Refill    cefUROXime (CEFTIN) 500 MG tablet Take 1

## 2024-01-30 ENCOUNTER — OFFICE VISIT (OUTPATIENT)
Age: 54
End: 2024-01-30

## 2024-01-30 DIAGNOSIS — S92.355D CLOSED NONDISPLACED FRACTURE OF FIFTH METATARSAL BONE OF LEFT FOOT WITH ROUTINE HEALING, SUBSEQUENT ENCOUNTER: ICD-10-CM

## 2024-01-30 DIAGNOSIS — M79.672 LEFT FOOT PAIN: Primary | ICD-10-CM

## 2024-01-30 PROCEDURE — 99024 POSTOP FOLLOW-UP VISIT: CPT

## 2024-06-27 DIAGNOSIS — M25.512 LEFT SHOULDER PAIN, UNSPECIFIED CHRONICITY: Primary | ICD-10-CM

## 2024-06-28 ENCOUNTER — OFFICE VISIT (OUTPATIENT)
Age: 54
End: 2024-06-28
Payer: MEDICARE

## 2024-06-28 DIAGNOSIS — M25.512 LEFT SHOULDER PAIN, UNSPECIFIED CHRONICITY: Primary | ICD-10-CM

## 2024-06-28 PROCEDURE — 99213 OFFICE O/P EST LOW 20 MIN: CPT | Performed by: ORTHOPAEDIC SURGERY

## 2024-06-28 PROCEDURE — 1036F TOBACCO NON-USER: CPT | Performed by: ORTHOPAEDIC SURGERY

## 2024-06-28 PROCEDURE — G8427 DOCREV CUR MEDS BY ELIG CLIN: HCPCS | Performed by: ORTHOPAEDIC SURGERY

## 2024-06-28 PROCEDURE — G8420 CALC BMI NORM PARAMETERS: HCPCS | Performed by: ORTHOPAEDIC SURGERY

## 2024-06-28 PROCEDURE — 3017F COLORECTAL CA SCREEN DOC REV: CPT | Performed by: ORTHOPAEDIC SURGERY

## 2024-06-28 NOTE — PROGRESS NOTES
continued conservative pain management options the patient was advised to utilize Tylenol or OTC NSAIDS as long as it is not medically contraindicated.     Return to Office:    Follow-up and Dispositions    Return for POST CT.        Scribed by Yenni Glez LPN as dictated by Sawyer Blake MD.  Documentation, performed by, True and Accepted Sawyer Blake MD

## 2024-07-02 DIAGNOSIS — M25.512 LEFT SHOULDER PAIN, UNSPECIFIED CHRONICITY: ICD-10-CM

## 2024-07-31 ENCOUNTER — HOSPITAL ENCOUNTER (OUTPATIENT)
Age: 54
Discharge: HOME | End: 2024-08-03
Attending: ORTHOPAEDIC SURGERY
Payer: MEDICARE

## 2024-07-31 DIAGNOSIS — M25.512 LEFT SHOULDER PAIN, UNSPECIFIED CHRONICITY: ICD-10-CM

## 2024-07-31 PROCEDURE — 73201 CT UPPER EXTREMITY W/DYE: CPT

## 2024-07-31 PROCEDURE — 6360000004 HC RX CONTRAST MEDICATION: Performed by: ORTHOPAEDIC SURGERY

## 2024-07-31 PROCEDURE — 2580000003 HC RX 258: Performed by: ORTHOPAEDIC SURGERY

## 2024-07-31 PROCEDURE — 77002 NEEDLE LOCALIZATION BY XRAY: CPT

## 2024-07-31 RX ORDER — IOPAMIDOL 612 MG/ML
15 INJECTION, SOLUTION INTRATHECAL
Status: COMPLETED | OUTPATIENT
Start: 2024-07-31 | End: 2024-07-31

## 2024-07-31 RX ORDER — SODIUM CHLORIDE 0.9 % (FLUSH) 0.9 %
5-40 SYRINGE (ML) INJECTION 2 TIMES DAILY
Status: DISCONTINUED | OUTPATIENT
Start: 2024-07-31 | End: 2024-08-04 | Stop reason: HOSPADM

## 2024-07-31 RX ADMIN — Medication 10 ML: at 13:03

## 2024-07-31 RX ADMIN — IOPAMIDOL 30 ML: 612 INJECTION, SOLUTION INTRATHECAL at 13:02

## 2024-08-02 ENCOUNTER — OFFICE VISIT (OUTPATIENT)
Age: 54
End: 2024-08-02
Payer: MEDICARE

## 2024-08-02 VITALS — HEIGHT: 63 IN | BODY MASS INDEX: 20.91 KG/M2 | WEIGHT: 118 LBS

## 2024-08-02 DIAGNOSIS — M75.52 BURSITIS OF LEFT SHOULDER: Primary | ICD-10-CM

## 2024-08-02 DIAGNOSIS — M25.512 LEFT SHOULDER PAIN, UNSPECIFIED CHRONICITY: ICD-10-CM

## 2024-08-02 PROCEDURE — G8427 DOCREV CUR MEDS BY ELIG CLIN: HCPCS | Performed by: ORTHOPAEDIC SURGERY

## 2024-08-02 PROCEDURE — 20611 DRAIN/INJ JOINT/BURSA W/US: CPT | Performed by: ORTHOPAEDIC SURGERY

## 2024-08-02 PROCEDURE — 3017F COLORECTAL CA SCREEN DOC REV: CPT | Performed by: ORTHOPAEDIC SURGERY

## 2024-08-02 PROCEDURE — G8420 CALC BMI NORM PARAMETERS: HCPCS | Performed by: ORTHOPAEDIC SURGERY

## 2024-08-02 PROCEDURE — 99213 OFFICE O/P EST LOW 20 MIN: CPT | Performed by: ORTHOPAEDIC SURGERY

## 2024-08-02 PROCEDURE — 1036F TOBACCO NON-USER: CPT | Performed by: ORTHOPAEDIC SURGERY

## 2024-08-02 RX ORDER — LIDOCAINE HYDROCHLORIDE 10 MG/ML
9 INJECTION, SOLUTION INFILTRATION; PERINEURAL ONCE
Status: COMPLETED | OUTPATIENT
Start: 2024-08-02 | End: 2024-08-02

## 2024-08-02 RX ORDER — TRIAMCINOLONE ACETONIDE 40 MG/ML
40 INJECTION, SUSPENSION INTRA-ARTICULAR; INTRAMUSCULAR ONCE
Status: COMPLETED | OUTPATIENT
Start: 2024-08-02 | End: 2024-08-02

## 2024-08-02 RX ORDER — LIDOCAINE 50 MG/G
PATCH TOPICAL
COMMUNITY
Start: 2024-07-25

## 2024-08-02 RX ADMIN — LIDOCAINE HYDROCHLORIDE 9 ML: 10 INJECTION, SOLUTION INFILTRATION; PERINEURAL at 09:50

## 2024-08-02 RX ADMIN — TRIAMCINOLONE ACETONIDE 40 MG: 40 INJECTION, SUSPENSION INTRA-ARTICULAR; INTRAMUSCULAR at 09:51

## 2024-08-02 NOTE — PROGRESS NOTES
The needle was withdrawn and the puncture site sealed with a Band-Aid.      Technique: Under sterile conditions a [employer] ultrasound unit with a variable frequency (7.0-14.0 MHz) linear transducer was used to localize the placement of needle into the left joint.    Findings: Successful needle placement for shoulder injection.  Final images were taken and saved for permanent record.      The patient tolerated the injection well. The patient was instructed to call the office immediately if there is any pain, redness, warmth, fever, or chills.    Encounter Diagnoses   Name Primary?    Bursitis of left shoulder Yes    Left shoulder pain, unspecified chronicity          HPI:  The patient is here with a chief complaint of left shoulder pain, throbbing, burning pain, diagnosed with little bit of bursitis.  Post MRI no rotator cuff tear.  Post CT scan arthrogram, no rotator cuff tear.     Assessment/Plan:  1.  Left shoulder with bursitis.    Plan will be for physical therapy, cortisone injection.  We will see her back as needed.  If she gets worse, she is to give me a call and we will go from there.    As part of continued conservative pain management options the patient was advised to utilize Tylenol or OTC NSAIDS as long as it is not medically contraindicated.     Return to Office:    Follow-up and Dispositions    Return if symptoms worsen or fail to improve.        Scribed by Sawyer Blake MD as dictated by Sawyer Blake MD.  Documentation, performed by, True and Accepted Sawyer Blake MD

## 2025-04-24 ENCOUNTER — OFFICE VISIT (OUTPATIENT)
Age: 55
End: 2025-04-24

## 2025-04-24 DIAGNOSIS — M18.12 PRIMARY OSTEOARTHRITIS OF FIRST CARPOMETACARPAL JOINT OF LEFT HAND: ICD-10-CM

## 2025-04-24 DIAGNOSIS — M79.642 LEFT HAND PAIN: Primary | ICD-10-CM

## 2025-04-24 RX ORDER — TRIAMCINOLONE ACETONIDE 40 MG/ML
40 INJECTION, SUSPENSION INTRA-ARTICULAR; INTRAMUSCULAR ONCE
Status: COMPLETED | OUTPATIENT
Start: 2025-04-24 | End: 2025-04-24

## 2025-04-24 RX ORDER — LIDOCAINE HYDROCHLORIDE 10 MG/ML
1 INJECTION, SOLUTION INFILTRATION; PERINEURAL ONCE
Status: COMPLETED | OUTPATIENT
Start: 2025-04-24 | End: 2025-04-24

## 2025-04-24 RX ADMIN — TRIAMCINOLONE ACETONIDE 40 MG: 40 INJECTION, SUSPENSION INTRA-ARTICULAR; INTRAMUSCULAR at 15:54

## 2025-04-24 RX ADMIN — LIDOCAINE HYDROCHLORIDE 1 ML: 10 INJECTION, SOLUTION INFILTRATION; PERINEURAL at 15:53

## 2025-04-24 NOTE — PROGRESS NOTES
of 1 mL Lidocaine 1%, 1 mL Kenalog 40 mg was injected under sterile technique. The needle was withdrawn and the puncture site sealed with a Band-Aid.      Technique: Under sterile conditions a [employer] ultrasound unit with a variable frequency (7.0-14.0 MHz) linear transducer was used to localize the placement of needle into the left finger.    Findings: Successful needle placement for finger injection.  Final images were taken and saved for permanent record.      The patient tolerated the injection well. The patient was instructed to call the office immediately if there is any pain, redness, warmth, fever, or chills.     Encounter Diagnoses   Name Primary?    Left hand pain Yes    Primary osteoarthritis of first carpometacarpal joint of left hand       History of Present Illness  The patient presents for evaluation of left thumb pain.    Left thumb discomfort, suspected due to overuse. Pain is pronounced with thumb movement towards fingers. Previous injections in hand and shoulder.    X-rays of the left hand 3 views are positive for CMC arthritis of the left thumb.  Assessment & Plan  1. Left thumb CMC arthritis:  Pain exacerbated by movement towards fingers.    Administer cortisone injection to left thumb for pain and inflammation relief. No follow-up scheduled, significant relief expected.    PROCEDURE  Administered cortisone injection to left thumb today.    As part of continued conservative pain management options the patient was advised to utilize Tylenol or OTC NSAIDS as long as it is not medically contraindicated.   Return to Office:    Follow-up and Dispositions    Return if symptoms worsen or fail to improve.        Scribed by Naomi Phillips LPN as dictated by Sawyer Blake MD.  Documentation, performed by, True and Accepted Sawyer Blake MD    The patient (or guardian, if applicable) and other individuals in attendance with the patient were advised that Artificial Intelligence will be utilized during

## (undated) DEVICE — SYRINGE MED 50ML LUERSLIP TIP

## (undated) DEVICE — YANKAUER,SMOOTH HANDLE,HIGH CAPACITY: Brand: MEDLINE INDUSTRIES, INC.

## (undated) DEVICE — CANNULA ORIG TL CLR W FOAM CUSHIONS AND 14FT SUPL TB 3 CHN

## (undated) DEVICE — LINER SUCT CANSTR 3000CC PLAS SFT PRE ASSEMB W/OUT TBNG W/

## (undated) DEVICE — SYRINGE 20ML LL S/C 50

## (undated) DEVICE — CATHETER SUCT TR FL TIP 14FR W/ O CTRL

## (undated) DEVICE — UNDERPAD INCONT W23XL36IN STD BLU POLYPR BK FLUF SFT

## (undated) DEVICE — SOLUTION IRRIG 1000ML H2O STRL BLT

## (undated) DEVICE — SYRINGE MED 10ML LUERLOCK TIP W/O SFTY DISP

## (undated) DEVICE — MEDI-VAC NON-CONDUCTIVE SUCTION TUBING: Brand: CARDINAL HEALTH

## (undated) DEVICE — GAUZE,SPONGE,4"X4",16PLY,STRL,LF,10/TRAY: Brand: MEDLINE

## (undated) DEVICE — ENDOSCOPY PUMP TUBING/ CAP SET: Brand: ERBE

## (undated) DEVICE — SYRINGE MED 25GA 3ML L5/8IN SUBQ PLAS W/ DETACH NDL SFTY

## (undated) DEVICE — GOWN PLASTIC FILM THMBHKS UNIV BLUE: Brand: CARDINAL HEALTH

## (undated) DEVICE — CANNULA NSL AD TBNG L14FT STD PVC O2 CRV CONN NONFLARED NSL